# Patient Record
Sex: MALE | Race: WHITE | NOT HISPANIC OR LATINO | Employment: FULL TIME | URBAN - NONMETROPOLITAN AREA
[De-identification: names, ages, dates, MRNs, and addresses within clinical notes are randomized per-mention and may not be internally consistent; named-entity substitution may affect disease eponyms.]

---

## 2023-05-28 ENCOUNTER — APPOINTMENT (OUTPATIENT)
Dept: GENERAL RADIOLOGY | Facility: HOSPITAL | Age: 28
End: 2023-05-28

## 2023-05-28 ENCOUNTER — HOSPITAL ENCOUNTER (EMERGENCY)
Facility: HOSPITAL | Age: 28
Discharge: HOME | End: 2023-05-28
Attending: EMERGENCY MEDICINE | Admitting: EMERGENCY MEDICINE

## 2023-05-28 VITALS
DIASTOLIC BLOOD PRESSURE: 67 MMHG | RESPIRATION RATE: 16 BRPM | BODY MASS INDEX: 32.9 KG/M2 | OXYGEN SATURATION: 100 % | WEIGHT: 235 LBS | TEMPERATURE: 98.1 F | SYSTOLIC BLOOD PRESSURE: 127 MMHG | HEIGHT: 71 IN | HEART RATE: 64 BPM

## 2023-05-28 DIAGNOSIS — R07.9 CHEST PAIN, UNSPECIFIED TYPE: Primary | ICD-10-CM

## 2023-05-28 LAB
ALBUMIN SERPL-MCNC: 4.1 G/DL (ref 3.5–5.2)
ALBUMIN/GLOB SERPL: 1.3 G/DL
ALP SERPL-CCNC: 76 U/L (ref 39–117)
ALT SERPL W P-5'-P-CCNC: 16 U/L (ref 1–41)
ANION GAP SERPL CALCULATED.3IONS-SCNC: 12.8 MMOL/L (ref 5–15)
AST SERPL-CCNC: 21 U/L (ref 1–40)
BASOPHILS # BLD AUTO: 0.02 10*3/MM3 (ref 0–0.2)
BASOPHILS NFR BLD AUTO: 0.3 % (ref 0–1.5)
BILIRUB SERPL-MCNC: 0.4 MG/DL (ref 0–1.2)
BUN SERPL-MCNC: 10 MG/DL (ref 6–20)
BUN/CREAT SERPL: 9.8 (ref 7–25)
CALCIUM SPEC-SCNC: 9.4 MG/DL (ref 8.6–10.5)
CHLORIDE SERPL-SCNC: 102 MMOL/L (ref 98–107)
CO2 SERPL-SCNC: 24.2 MMOL/L (ref 22–29)
CREAT SERPL-MCNC: 1.02 MG/DL (ref 0.76–1.27)
DEPRECATED RDW RBC AUTO: 41.7 FL (ref 37–54)
EGFRCR SERPLBLD CKD-EPI 2021: 103.3 ML/MIN/1.73
EOSINOPHIL # BLD AUTO: 0.17 10*3/MM3 (ref 0–0.4)
EOSINOPHIL NFR BLD AUTO: 2.5 % (ref 0.3–6.2)
ERYTHROCYTE [DISTWIDTH] IN BLOOD BY AUTOMATED COUNT: 13.6 % (ref 12.3–15.4)
GEN 5 2HR TROPONIN T REFLEX: <6 NG/L
GLOBULIN UR ELPH-MCNC: 3.2 GM/DL
GLUCOSE SERPL-MCNC: 108 MG/DL (ref 65–99)
HCT VFR BLD AUTO: 48.4 % (ref 37.5–51)
HGB BLD-MCNC: 15.4 G/DL (ref 13–17.7)
HOLD SPECIMEN: NORMAL
HOLD SPECIMEN: NORMAL
IMM GRANULOCYTES # BLD AUTO: 0.02 10*3/MM3 (ref 0–0.05)
IMM GRANULOCYTES NFR BLD AUTO: 0.3 % (ref 0–0.5)
LIPASE SERPL-CCNC: 45 U/L (ref 13–60)
LYMPHOCYTES # BLD AUTO: 2.4 10*3/MM3 (ref 0.7–3.1)
LYMPHOCYTES NFR BLD AUTO: 35.5 % (ref 19.6–45.3)
MCH RBC QN AUTO: 26.8 PG (ref 26.6–33)
MCHC RBC AUTO-ENTMCNC: 31.8 G/DL (ref 31.5–35.7)
MCV RBC AUTO: 84.2 FL (ref 79–97)
MONOCYTES # BLD AUTO: 0.73 10*3/MM3 (ref 0.1–0.9)
MONOCYTES NFR BLD AUTO: 10.8 % (ref 5–12)
NEUTROPHILS NFR BLD AUTO: 3.43 10*3/MM3 (ref 1.7–7)
NEUTROPHILS NFR BLD AUTO: 50.6 % (ref 42.7–76)
NRBC BLD AUTO-RTO: 0 /100 WBC (ref 0–0.2)
PLATELET # BLD AUTO: 318 10*3/MM3 (ref 140–450)
PMV BLD AUTO: 8.7 FL (ref 6–12)
POTASSIUM SERPL-SCNC: 4.4 MMOL/L (ref 3.5–5.2)
PROT SERPL-MCNC: 7.3 G/DL (ref 6–8.5)
RBC # BLD AUTO: 5.75 10*6/MM3 (ref 4.14–5.8)
SODIUM SERPL-SCNC: 139 MMOL/L (ref 136–145)
TROPONIN T DELTA: NORMAL
TROPONIN T SERPL HS-MCNC: <6 NG/L
WBC NRBC COR # BLD: 6.77 10*3/MM3 (ref 3.4–10.8)
WHOLE BLOOD HOLD COAG: NORMAL
WHOLE BLOOD HOLD SPECIMEN: NORMAL

## 2023-05-28 PROCEDURE — 36415 COLL VENOUS BLD VENIPUNCTURE: CPT

## 2023-05-28 PROCEDURE — 84484 ASSAY OF TROPONIN QUANT: CPT

## 2023-05-28 PROCEDURE — 71045 X-RAY EXAM CHEST 1 VIEW: CPT

## 2023-05-28 PROCEDURE — 99284 EMERGENCY DEPT VISIT MOD MDM: CPT

## 2023-05-28 PROCEDURE — 83690 ASSAY OF LIPASE: CPT | Performed by: EMERGENCY MEDICINE

## 2023-05-28 PROCEDURE — 85025 COMPLETE CBC W/AUTO DIFF WBC: CPT

## 2023-05-28 PROCEDURE — 80053 COMPREHEN METABOLIC PANEL: CPT

## 2023-05-28 PROCEDURE — 93005 ELECTROCARDIOGRAM TRACING: CPT

## 2023-05-28 RX ORDER — SODIUM CHLORIDE 0.9 % (FLUSH) 0.9 %
10 SYRINGE (ML) INJECTION AS NEEDED
Status: DISCONTINUED | OUTPATIENT
Start: 2023-05-28 | End: 2023-05-28 | Stop reason: HOSPADM

## 2023-05-28 NOTE — ED PROVIDER NOTES
"Subjective  History of Present Illness:    Chief Complaint: Chest pain  History of Present Illness: 27-year-old male began having chest pain about 45 minutes prior, states he was sitting at a table at work when a head chest pain feeling of flushing, thought he might pass out.  EMS administered 1 sublingual nitroglycerin and 3 and 24 mg of aspirin.  No fever no cough no tobacco abuse no hemoptysis syncope palpitations edema    Nurses Notes reviewed and agree, including vitals, allergies, social history and prior medical history.     REVIEW OF SYSTEMS: All systems reviewed and not pertinent unless noted.  Review of Systems      Positive for: Chest pain    Negative for: Fever cough hemoptysis palpitations edema    Past Medical History:   Diagnosis Date   • Disease of thyroid gland        Allergies:    Augmentin [amoxicillin-pot clavulanate]      Past Surgical History:   Procedure Laterality Date   • TONSILLECTOMY           Social History     Socioeconomic History   • Marital status: Single   Tobacco Use   • Smoking status: Never         History reviewed. No pertinent family history.    Objective  Physical Exam:  /67 (BP Location: Left arm)   Pulse 64   Temp 98.1 °F (36.7 °C) (Oral)   Resp 16   Ht 180.3 cm (71\")   Wt 107 kg (235 lb)   SpO2 100%   BMI 32.78 kg/m²      Physical Exam    CONSTITUTIONAL: Well developed, nontoxic 27-year-old male,  in no acute distress.  VITAL SIGNS: per nursing, reviewed and noted  SKIN: exposed skin with no rashes, ulcerations or petechiae  EYES: Grossly EOMI, no icterus  ENT: Normal voice.  Moist mucous membranes   RESPIRATORY:  No increased work of breathing. No retractions.   CARDIOVASCULAR:   Extremities pink and warm.  Good cap refill to extremities.   GI: Abdomen without distention   MUSCULOSKELETAL: Age-appropriate bulk and tone, moves all 4 extremities  NEUROLOGIC: Alert, oriented x 3. No gross deficits. GCS 15.   PSYCH: appropriate affect.  : no bladder tenderness or " distention, no CVA tenderness    Procedures    ED Course:    Lab Results (last 24 hours)     Procedure Component Value Units Date/Time    CBC & Differential [[id number]]  (Normal) Collected: 05/28/23 0616    Specimen: Blood Updated: 05/28/23 0621    Narrative:      The following orders were created for panel order CBC & Differential.  Procedure                               Abnormality         Status                     ---------                               -----------         ------                     CBC Auto Differential[[id number]]        Normal              Final result                 Please view results for these tests on the individual orders.    Comprehensive Metabolic Panel [[id number]]  (Abnormal) Collected: 05/28/23 0616    Specimen: Blood Updated: 05/28/23 0639     Glucose 108 mg/dL      BUN 10 mg/dL      Creatinine 1.02 mg/dL      Sodium 139 mmol/L      Potassium 4.4 mmol/L      Comment: Specimen hemolyzed.  Results may be affected.        Chloride 102 mmol/L      CO2 24.2 mmol/L      Calcium 9.4 mg/dL      Total Protein 7.3 g/dL      Albumin 4.1 g/dL      ALT (SGPT) 16 U/L      Comment: Specimen hemolyzed.  Results may be affected.        AST (SGOT) 21 U/L      Comment: Specimen hemolyzed.  Results may be affected.        Alkaline Phosphatase 76 U/L      Total Bilirubin 0.4 mg/dL      Globulin 3.2 gm/dL      A/G Ratio 1.3 g/dL      BUN/Creatinine Ratio 9.8     Anion Gap 12.8 mmol/L      eGFR 103.3 mL/min/1.73     Narrative:      GFR Normal >60  Chronic Kidney Disease <60  Kidney Failure <15      High Sensitivity Troponin T [[id number]]  (Normal) Collected: 05/28/23 0616    Specimen: Blood Updated: 05/28/23 0643     HS Troponin T <6 ng/L      Comment: Specimen hemolyzed.  Results may be affected.       Narrative:      High Sensitive Troponin T Reference Range:  <10.0 ng/L- Negative Female for AMI  <15.0 ng/L- Negative Male for AMI  >=10 - Abnormal Female indicating possible myocardial injury.  >=15 -  Abnormal Male indicating possible myocardial injury.   Clinicians would have to utilize clinical acumen, EKG, Troponin, and serial changes to determine if it is an Acute Myocardial Infarction or myocardial injury due to an underlying chronic condition.         CBC Auto Differential [[id number]]  (Normal) Collected: 05/28/23 0616    Specimen: Blood Updated: 05/28/23 0621     WBC 6.77 10*3/mm3      RBC 5.75 10*6/mm3      Hemoglobin 15.4 g/dL      Hematocrit 48.4 %      MCV 84.2 fL      MCH 26.8 pg      MCHC 31.8 g/dL      RDW 13.6 %      RDW-SD 41.7 fl      MPV 8.7 fL      Platelets 318 10*3/mm3      Neutrophil % 50.6 %      Lymphocyte % 35.5 %      Monocyte % 10.8 %      Eosinophil % 2.5 %      Basophil % 0.3 %      Immature Grans % 0.3 %      Neutrophils, Absolute 3.43 10*3/mm3      Lymphocytes, Absolute 2.40 10*3/mm3      Monocytes, Absolute 0.73 10*3/mm3      Eosinophils, Absolute 0.17 10*3/mm3      Basophils, Absolute 0.02 10*3/mm3      Immature Grans, Absolute 0.02 10*3/mm3      nRBC 0.0 /100 WBC     Lipase [[id number]]  (Normal) Collected: 05/28/23 0616    Specimen: Blood Updated: 05/28/23 0722     Lipase 45 U/L     High Sensitivity Troponin T 2Hr [[id number]] Collected: 05/28/23 0816    Specimen: Blood Updated: 05/28/23 0838     HS Troponin T <6 ng/L      Troponin T Delta --     Comment: Unable to calculate.       Narrative:      High Sensitive Troponin T Reference Range:  <10.0 ng/L- Negative Female for AMI  <15.0 ng/L- Negative Male for AMI  >=10 - Abnormal Female indicating possible myocardial injury.  >=15 - Abnormal Male indicating possible myocardial injury.   Clinicians would have to utilize clinical acumen, EKG, Troponin, and serial changes to determine if it is an Acute Myocardial Infarction or myocardial injury due to an underlying chronic condition.                XR Chest 1 View    Result Date: 5/28/2023  PROCEDURE: XR CHEST 1 VW-  INDICATION:  Chest Pain Triage Protocol  FINDINGS:  A portable view of  the chest was obtained.  There is no prior exam for comparison.  The cardiac and mediastinal silhouettes are within normal limits. There is a granuloma at the right lung base, otherwise, the lungs are clear.  There is no pleural effusion or pneumothorax.  No acute osseous abnormality is identified.       Impression: No acute cardiac or pulmonary disease on this portable exam.  This report was signed and finalized on 5/28/2023 7:52 AM by Ofelia Colmenares MD.       [facility]    ED Course as of 05/28/23 1610   Sun May 28, 2023   0622 EKG interpreted by me, normal sinus rhythm with no concerning ST changes noted, rate of 78 [JE]      ED Course User Index  [JE] Ramón Johansen MD       Medical Decision Making:    Initial impression of presenting illness: 27-year-old male began having chest pain about 45 minutes prior, states he was sitting at a table at work when a head chest pain feeling of flushing, thought he might pass out.    DDX: includes but is not limited to: Nonspecific chest pain near syncope electrolyte derangement, arrhythmia         Patient arrives normotensive afebrile no tachycardia oxygen saturations 100% room air with vitals interpreted by myself.     Pertinent features from physical exam: Benign exam.    Initial diagnostic plan: Chest pain rule out clinic CBC CMP troponin EKG cardiac monitoring    Results from initial plan were reviewed and interpreted by me revealing nonischemic EKG without ectopy.  Chest x-ray without acute findings per my interpretation.  Serial troponins without delta change.  Nonactionable CBC CMP no pancreatitis    Diagnostic information from other sources:      Interventions / Re-evaluation: Spontaneous improvement.  No further pain    Results/clinical rationale were discussed with patient and family member    Consultations/Discussion of results with other physicians: None    Disposition plan: Patient was discharged in home stable condition.  Counseled on supportive care, outpatient  follow-up. Return precaution discussed.  Patient/family was understanding and agreeable with plan    -----      Final diagnoses:   Chest pain, unspecified type        Jude Nuñez, DO  05/28/23 1807